# Patient Record
Sex: MALE | Race: WHITE | Employment: FULL TIME | URBAN - METROPOLITAN AREA
[De-identification: names, ages, dates, MRNs, and addresses within clinical notes are randomized per-mention and may not be internally consistent; named-entity substitution may affect disease eponyms.]

---

## 2019-05-30 ENCOUNTER — OFFICE VISIT (OUTPATIENT)
Dept: UROLOGY | Age: 44
End: 2019-05-30

## 2019-05-30 VITALS
HEIGHT: 68 IN | BODY MASS INDEX: 29.1 KG/M2 | WEIGHT: 192 LBS | OXYGEN SATURATION: 97 % | SYSTOLIC BLOOD PRESSURE: 110 MMHG | HEART RATE: 70 BPM | DIASTOLIC BLOOD PRESSURE: 80 MMHG

## 2019-05-30 DIAGNOSIS — N47.1 PHIMOSIS: ICD-10-CM

## 2019-05-30 DIAGNOSIS — N48.89 PENILE SKIN BRIDGE: Primary | ICD-10-CM

## 2019-05-30 LAB
BILIRUB UR QL STRIP: NEGATIVE
GLUCOSE UR-MCNC: NEGATIVE MG/DL
KETONES P FAST UR STRIP-MCNC: NEGATIVE MG/DL
PH UR STRIP: 5.5 [PH] (ref 4.6–8)
PROT UR QL STRIP: NEGATIVE
SP GR UR STRIP: 1.02 (ref 1–1.03)
UA UROBILINOGEN AMB POC: NORMAL (ref 0.2–1)
URINALYSIS CLARITY POC: CLEAR
URINALYSIS COLOR POC: YELLOW
URINE BLOOD POC: NEGATIVE
URINE LEUKOCYTES POC: NEGATIVE
URINE NITRITES POC: NEGATIVE

## 2019-05-30 RX ORDER — LOSARTAN POTASSIUM 100 MG/1
TABLET ORAL
Refills: 0 | COMMUNITY
Start: 2019-05-05

## 2019-05-30 RX ORDER — GUAIFENESIN 100 MG/5ML
81 LIQUID (ML) ORAL
COMMUNITY

## 2019-05-30 RX ORDER — FLUTICASONE PROPIONATE 50 MCG
1 SPRAY, SUSPENSION (ML) NASAL
COMMUNITY

## 2019-05-30 RX ORDER — AMLODIPINE BESYLATE 5 MG/1
TABLET ORAL
Refills: 0 | COMMUNITY
Start: 2019-05-14

## 2019-05-30 RX ORDER — LOSARTAN POTASSIUM 100 MG/1
100 TABLET ORAL
COMMUNITY
Start: 2018-05-16 | End: 2019-05-30 | Stop reason: SDUPTHER

## 2019-05-30 RX ORDER — CHOLECALCIFEROL (VITAMIN D3) 125 MCG
1 CAPSULE ORAL
COMMUNITY

## 2019-05-30 NOTE — PROGRESS NOTES
Thelma Quinonez 37 y.o. male     Mr. Thelma Arnold seen today for evaluation of lifelong penile skin bridge now causing minor trouble with hygiene  No irritative or obstructive voiding symptoms  Grandfather with prostate carcinoma        Review of Systems:   CNS: No seizure syncope headaches dizziness or visual changes  Respiratory: No wheezing shortness of breath chest pain or coughing  Cardiovascular: No palpitations no angina  Intestinal: No dyspepsia diarrhea or constipation  Urinary: No urgency frequency dysuria or hematuria  Skeletal: No bone or joint pain  Endocrine: Thyroid disease  Other:    Allergies: No Known Allergies   Medications:    Current Outpatient Medications   Medication Sig Dispense Refill    losartan (COZAAR) 100 mg tablet   0    amLODIPine (NORVASC) 5 mg tablet   0    aspirin 81 mg chewable tablet Take 81 mg by mouth.  fluticasone propionate (FLONASE) 50 mcg/actuation nasal spray 1 Spray.  cholecalciferol, vitamin D3, 2,000 unit tab Take 1 Tab by mouth. Past Medical History:   Diagnosis Date    Hypertension     Thyroid disease       History reviewed. No pertinent surgical history.   Social History     Socioeconomic History    Marital status:      Spouse name: Not on file    Number of children: Not on file    Years of education: Not on file    Highest education level: Not on file   Occupational History    Not on file   Social Needs    Financial resource strain: Not on file    Food insecurity:     Worry: Not on file     Inability: Not on file    Transportation needs:     Medical: Not on file     Non-medical: Not on file   Tobacco Use    Smoking status: Never Smoker    Smokeless tobacco: Never Used   Substance and Sexual Activity    Alcohol use: Never     Frequency: Never    Drug use: Not on file    Sexual activity: Not on file   Lifestyle    Physical activity:     Days per week: Not on file     Minutes per session: Not on file    Stress: Not on file Relationships    Social connections:     Talks on phone: Not on file     Gets together: Not on file     Attends Anabaptist service: Not on file     Active member of club or organization: Not on file     Attends meetings of clubs or organizations: Not on file     Relationship status: Not on file    Intimate partner violence:     Fear of current or ex partner: Not on file     Emotionally abused: Not on file     Physically abused: Not on file     Forced sexual activity: Not on file   Other Topics Concern    Not on file   Social History Narrative    Not on file      Family History   Problem Relation Age of Onset    Hypertension Mother          Physical Examination: Well-nourished mature male in no apparent distress    Abdomen is nontender no palpable masses no organomegaly  Back-no percussion CVA tenderness on either side  No inguinal hernia or adenopathy  Penis is  circumcised with a 3 mm left dorsal skin bridge  Testes are normal in size shape and consistency bilaterally-no epididymal induration or tenderness on either side  Spermatic cords are palpably normal bilaterally  Scrotum is normal  Prostate by SHARON is rounded, smooth, benign in consistency and nontender-no induration no nodularity  No rectal masses tenderness or induration      Urinalysis: Negative dipstick/nitrite negative    Impression: Symptomatic coronal penile skin bridge        Plan: Excision under local    PSA today    RTC 3 weeks excision of coronal skin bridge        lAly Arredondo MD  -electronically signed-    PLEASE NOTE:  This document has been produced using voice recognition software. Unrecognized errors in transcription may be present.

## 2022-02-14 ENCOUNTER — HOSPITAL ENCOUNTER (OUTPATIENT)
Dept: NUTRITION | Age: 47
Discharge: HOME | End: 2022-02-14
Payer: COMMERCIAL

## 2022-02-14 PROCEDURE — 97802 MEDICAL NUTRITION INDIV IN: CPT

## 2022-02-14 NOTE — PROGRESS NOTES
..  [address] [place] [address]. [facility], [address] [place] [address]   Nutrition Assessment - Medical Nutrition Therapy   Outpatient Initial Evaluation         Patient Name: Faye Lawrence III : 1975   Treatment Diagnosis: hyperlipidemia   Referral Source: Koffi Lopez MD Start of Care [facility]): 2022     Gender: male Age: 55 y.o. Ht: 68 in Wt: 184  lb  kg   BMI: 27.98 BMR   Male 7517 BMR Female      Past Medical History:  CVA , HTN, syncope, acute renal insufficiency     Pertinent Medications:   ASA 81 mg, Norvasc 5 mg, Vit D, Losartan 100 mg, Lipitor 40 mg     Biochemical Data:     22: chol 93, TG 90, HDL 32, LDL 43     Assessment:    The patient is here today because he had a stroke in 2021 and he'd like to be proactive. He has never been a smoker. He has a family history of diabetes and heart disease. The patient states that he lost weight after his stroke and he got too thin. He is comfortable at 185# even though he knows he is considered overweight at that weight. He says that he has changed his diet. He used to go to the gym but got away from that during the pandemic. He said that he has purchased gym equipment for his home and intends to use it. He desires to not have to take blood pressure medicine but he realizes he may have to because his health problems may be genetic. Today he would like to learn healthy diet strategies that will keep his blood pressure in check without getting too thin. His latest lipid levels are WNL. Food & Nutrition: The patient did not provide RD with a diet history today. He says he has changed his diet. He cut out red meat. He prepares the meals for the family. (His wife does not cook). He knows he needs to stay away from convenience items, fast food and salt.         Estimate Needs   Calories: 1600  Protein: 100 Carbs: 180 Fat: 53   Kcal/day  g/day  g/day  g/day        percent: 25  45  30 Nutrition Diagnosis Nutrition knowledge deficit related to heart healthy diet AEB recent stroke       Nutrition Intervention &  Education: Educated the patient on heart healthy diet. Encouraged the patient to eat 3 balanced meals per day, spacing meals every 4-5 hours. Include plenty of fiber in fruits, vegetables and whole grains. Limit red meat, processed meats, high saturated fat foods and fast food. Limit sodium in the diet to 2300 mg. Do not use salt or salt products at the table. Choose more fresh or frozen vegetables. Can purchase Mrs. Gagnon or fresh or dried herbs and spices to season foods. Encouraged 30 minutes of cardio per day (try walking after meals) and strength training (light weights)  for 20 minutes 2-3 times per week to build muscle mass. Encouraged adequate fluids (64 oz water daily). No sugary drinks. Limit caffeine. No energy drinks. Education materials and RD contact information provided for further questions or concerns. Handouts Provided: [x]  Carbohydrates  [x]  Protein  [x]  Non-starchy Vegetables  []  Food Label  [x]  Meal and Snack Ideas  []  Food Journals []  Diabetes  [x]  Cholesterol  [x]  Sodium  [x]  Gen Nutr Guidelines  []  SBGM Guidelines  [x]  Others: My Healthy Plate   Information Reviewed with: Patient   Readiness to Change Stage: []  Pre-contemplative    []  Contemplative  []  Preparation               [x]  Action                  []  Maintenance   Potential Barriers to Learning: []  Decline in memory    []  Language barrier   []  Other:  []  Emotional                  []  Limited mobility  []  Lack of motivation     [] Vision, hearing or cognitive impairment   Expected Compliance: Good- The patient is very motivated to make lifestyle changes to prevent future strokes. He recognizes that he has a strong family hx for ASCVD and he wants to do everything he can to take care of himself.      Nutritional Goal - To promote lifestyle changes to result in:    []  Weight loss  [] Improved diabetic control  []  Decreased cholesterol levels  [x]  Decreased blood pressure  []  Weight maintenance []  Preventing any interactions associated with food allergies  []  Adequate weight gain toward goal weight  []  Other:        Patient Goals:   1. Include 3 balanced meals per day (vegetables at lunch and dinner, lean meats, controlled portions of carbs)  2. 64 oz water daily (no sugary drinks)  3. Watch sodium intake (2300 mg Na per day)  4.  Cardio 30 minutes per day, strength training 20 minutes, 2-3 days per week        Dietitian Signature: Harleen Kim RD,[facility] Date: 2/14/2022   Follow-up: PRN Time: 11:25 AM